# Patient Record
Sex: MALE | Race: BLACK OR AFRICAN AMERICAN | NOT HISPANIC OR LATINO | Employment: FULL TIME | URBAN - METROPOLITAN AREA
[De-identification: names, ages, dates, MRNs, and addresses within clinical notes are randomized per-mention and may not be internally consistent; named-entity substitution may affect disease eponyms.]

---
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
## 2025-03-17 ENCOUNTER — OFFICE VISIT (OUTPATIENT)
Dept: CARDIOLOGY | Facility: CLINIC | Age: 20
End: 2025-03-17
Payer: COMMERCIAL

## 2025-03-17 VITALS — WEIGHT: 174 LBS | OXYGEN SATURATION: 100 % | BODY MASS INDEX: 23.06 KG/M2 | HEIGHT: 73 IN

## 2025-03-17 DIAGNOSIS — R06.02 SHORTNESS OF BREATH: Primary | ICD-10-CM

## 2025-03-17 DIAGNOSIS — R00.2 PALPITATIONS: ICD-10-CM

## 2025-03-17 PROCEDURE — 99203 OFFICE O/P NEW LOW 30 MIN: CPT | Performed by: INTERNAL MEDICINE

## 2025-03-17 PROCEDURE — 93000 ELECTROCARDIOGRAM COMPLETE: CPT | Performed by: INTERNAL MEDICINE

## 2025-03-17 NOTE — PROGRESS NOTES
"    Subjective:     Encounter Date:03/17/2025      Patient ID: Indigo Vogel is a 19 y.o. male.    Chief Complaint:  History of Present Illness 19-year-old male with no significant past cardiac or medical history presents to my office for a new consultation.  Patient has been having occasional episodes of shortness of breath but not too bad.  No complaint any chest pain.  He been having some palpitations which last about half hour sometimes.  He also has been having some dizziness.  No syncope or swelling of the feet.  Is not on any medicines.  He vapes.  He does not have any family history.  Ht 185.4 cm (72.99\")   Wt 78.9 kg (174 lb)   SpO2 100%   BMI 22.96 kg/m²     The following portions of the patient's history were reviewed and updated as appropriate: allergies, current medications, past family history, past medical history, past social history, past surgical history, and problem list.  History reviewed. No pertinent past medical history.  Past Surgical History:   Procedure Laterality Date    WISDOM TOOTH EXTRACTION Bilateral 2022     Social History     Socioeconomic History    Marital status: Single   Tobacco Use    Smoking status: Never     Passive exposure: Never    Smokeless tobacco: Never   Vaping Use    Vaping status: Every Day    Substances: Nicotine, Flavoring    Devices: Disposable    Passive vaping exposure: Yes   Substance and Sexual Activity    Alcohol use: Yes     Comment: socially    Drug use: Yes     Types: Marijuana    Sexual activity: Defer     Family History   Problem Relation Age of Onset    Hypertension Mother     COPD Mother     No Known Problems Father     Heart failure Maternal Grandmother     Hypertension Maternal Grandmother     Heart attack Maternal Grandfather      No current outpatient medications on file.  No Known Allergies    Review of Systems   Constitutional: Positive for malaise/fatigue.   Cardiovascular:  Positive for leg swelling and palpitations. Negative for chest pain " and dyspnea on exertion.   Respiratory:  Positive for shortness of breath. Negative for cough.    Gastrointestinal:  Positive for nausea and vomiting. Negative for abdominal pain.   Neurological:  Positive for dizziness, light-headedness and numbness. Negative for focal weakness and headaches.   All other systems reviewed and are negative.             Objective:     Constitutional:       Appearance: Well-developed.   Eyes:      General: No scleral icterus.     Conjunctiva/sclera: Conjunctivae normal.      Pupils: Pupils are equal, round, and reactive to light.   HENT:      Head: Normocephalic and atraumatic.   Neck:      Vascular: No carotid bruit or JVD.   Pulmonary:      Effort: Pulmonary effort is normal.      Breath sounds: Normal breath sounds. No wheezing. No rales.   Cardiovascular:      Normal rate. Regular rhythm.   Pulses:     Intact distal pulses.   Abdominal:      General: Bowel sounds are normal.      Palpations: Abdomen is soft.   Musculoskeletal: Normal range of motion.      Cervical back: Normal range of motion and neck supple. Skin:     General: Skin is warm and dry.      Findings: No rash.   Neurological:      Mental Status: Alert.      Comments: No focal deficits           ECG 12 Lead    Date/Time: 3/17/2025 11:28 AM  Performed by: Jason Jackson MD    Authorized by: Jason Jackson MD  Comments: Sinus rhythm  Early repolarization changes  Normal ECG  No previous ECGs          Lab Review:       Assessment:          Diagnosis Plan   1. Shortness of breath        2. Palpitations               Plan:       Patient presented with palpitations and dizziness mainly blood.  He had orthostatic blood pressures checked which did not show any orthostatic hypotension  Patient will have a Holter monitor for 15 days to assess his symptoms  Patient is advised to watch his caffeine intake  Patient should have his electrolytes and thyroid function test checked.  Patient is also advised to stop vaping

## 2025-03-18 ENCOUNTER — PATIENT ROUNDING (BHMG ONLY) (OUTPATIENT)
Dept: CARDIOLOGY | Facility: CLINIC | Age: 20
End: 2025-03-18
Payer: COMMERCIAL